# Patient Record
Sex: FEMALE | Race: WHITE | NOT HISPANIC OR LATINO | Employment: FULL TIME | URBAN - METROPOLITAN AREA
[De-identification: names, ages, dates, MRNs, and addresses within clinical notes are randomized per-mention and may not be internally consistent; named-entity substitution may affect disease eponyms.]

---

## 2020-02-21 ENCOUNTER — OFFICE VISIT (OUTPATIENT)
Dept: OBGYN CLINIC | Facility: CLINIC | Age: 65
End: 2020-02-21
Payer: COMMERCIAL

## 2020-02-21 ENCOUNTER — APPOINTMENT (OUTPATIENT)
Dept: RADIOLOGY | Facility: CLINIC | Age: 65
End: 2020-02-21
Payer: COMMERCIAL

## 2020-02-21 VITALS
BODY MASS INDEX: 21.14 KG/M2 | HEIGHT: 61 IN | WEIGHT: 112 LBS | DIASTOLIC BLOOD PRESSURE: 80 MMHG | SYSTOLIC BLOOD PRESSURE: 118 MMHG

## 2020-02-21 DIAGNOSIS — M22.42 CHONDROMALACIA PATELLAE OF LEFT KNEE: Primary | ICD-10-CM

## 2020-02-21 DIAGNOSIS — M25.562 LEFT KNEE PAIN, UNSPECIFIED CHRONICITY: ICD-10-CM

## 2020-02-21 PROCEDURE — 73562 X-RAY EXAM OF KNEE 3: CPT

## 2020-02-21 PROCEDURE — 73564 X-RAY EXAM KNEE 4 OR MORE: CPT

## 2020-02-21 PROCEDURE — 99203 OFFICE O/P NEW LOW 30 MIN: CPT | Performed by: ORTHOPAEDIC SURGERY

## 2020-02-21 RX ORDER — CHOLECALCIFEROL (VITAMIN D3) 125 MCG
TABLET ORAL
COMMUNITY

## 2020-02-21 RX ORDER — CLONAZEPAM 0.5 MG/1
TABLET ORAL
COMMUNITY

## 2020-02-21 RX ORDER — LEVOTHYROXINE SODIUM 137 UG/1
TABLET ORAL
COMMUNITY
Start: 2011-07-05

## 2020-02-21 NOTE — PROGRESS NOTES
Ortho Sports Medicine Knee New Patient Visit     Assesment:   59year old Female Left knee chondromalacia of the patella     Plan:    Conservative treatment:    Ice to knee for 20 minutes at least 1-2 times daily  PT for ROM/strengthening to knee, hip and core  OTC NSAIDS prn for pain  Imaging: All imaging from today was reviewed by myself and explained to the patient  Injection:    No Injection planned at this time  Surgery:     No surgery is recommended at this point, continue with conservative treatment plan as noted  Follow up:    Return in about 6 weeks (around 4/3/2020) for Recheck  If no relief from PT consider MRI         Chief Complaint   Patient presents with    Left Knee - Pain       History of Present Illness: The patient is a 59 y o  female whose occupation is , referred to me by their primary care physician, seen in clinic for consultation of left knee pain  Pain is located anterior, lateral, medial   The patient rates the pain as a varies/10  The pain has been present for 3 months  The patient denies injury  She states that the pain began about 3 months ago and has gradually gotten worse  She stated that the knee increasing pain on Monday this week  She states it did she woke up in the middle of the night with extreme pain behind the kneecap  Then the next morning she was limping   She denies any injury on Monday and denies any previous injury in the past that started this knee pain  She stated that the pain initially began posteriorly stating that she felt like it was a vascular problem  The pain felt like a pulling sensation in the proximal aspect of the calf  In addition to the pain she also feels like she has lost mobility in the left leg stating that she is unable to sit cross-legged with this leg without feeling a pulling sensation in the anterior medial aspect of the knee  She denies any previous treatment    She denies bracing physical therapy or injection  The pain is characterized as sharp, stabbing, dull, achy  The pain is present daily  She denies any clicking or catching within the knee  She denies instability  She denies swelling  She denies pain waking her up from sleep except that Monday night  She denies aggravation with stairs or squatting  Pain is improved by rest   Pain is aggravated by hyperflexion and kneeling  The patient has tried rest, ice and NSAIDS  Knee Surgical History:  None    Past Medical, Social and Family History:  History reviewed  No pertinent past medical history  Past Surgical History:   Procedure Laterality Date     SECTION      VASCULAR SURGERY       No Known Allergies  Current Outpatient Medications on File Prior to Visit   Medication Sig Dispense Refill    clonazePAM (KlonoPIN) 0 5 mg tablet take 1 tablet by oral route 3 times every day      Ergocalciferol (VITAMIN D2) 50 MCG (2000) TABS NO SIG SUPPLIED      levothyroxine (Synthroid) 137 mcg tablet take 1 tablet (25MCG)  by oral route  every day       No current facility-administered medications on file prior to visit        Social History     Socioeconomic History    Marital status: /Civil Union     Spouse name: Not on file    Number of children: Not on file    Years of education: Not on file    Highest education level: Not on file   Occupational History    Not on file   Social Needs    Financial resource strain: Not on file    Food insecurity:     Worry: Not on file     Inability: Not on file    Transportation needs:     Medical: Not on file     Non-medical: Not on file   Tobacco Use    Smoking status: Never Smoker    Smokeless tobacco: Never Used   Substance and Sexual Activity    Alcohol use: Never     Frequency: Never    Drug use: Never    Sexual activity: Not on file   Lifestyle    Physical activity:     Days per week: Not on file     Minutes per session: Not on file    Stress: Not on file   Relationships    Social connections:     Talks on phone: Not on file     Gets together: Not on file     Attends Latter day service: Not on file     Active member of club or organization: Not on file     Attends meetings of clubs or organizations: Not on file     Relationship status: Not on file    Intimate partner violence:     Fear of current or ex partner: Not on file     Emotionally abused: Not on file     Physically abused: Not on file     Forced sexual activity: Not on file   Other Topics Concern    Not on file   Social History Narrative    Not on file         I have reviewed the past medical, surgical, social and family history, medications and allergies as documented in the EMR  Review of systems: ROS is negative other than that noted in the HPI  Constitutional: Negative for fatigue and fever  HENT: Negative for sore throat  Respiratory: Negative for shortness of breath  Cardiovascular: Negative for chest pain  Gastrointestinal: Negative for abdominal pain  Endocrine: Negative for cold intolerance and heat intolerance  Genitourinary: Negative for flank pain  Musculoskeletal: Negative for back pain  Skin: Negative for rash  Allergic/Immunologic: Negative for immunocompromised state  Neurological: Negative for dizziness  Psychiatric/Behavioral: Negative for agitation  Physical Exam:    Blood pressure 118/80, height 5' 1" (1 549 m), weight 50 8 kg (112 lb)      General/Constitutional: NAD, well developed, well nourished  HENT: Normocephalic, atraumatic  CV: Intact distal pulses, regular rate  Resp: No respiratory distress or labored breathing  Lymphatic: No lymphadenopathy palpated  Neuro: Alert and Oriented x 3, no focal deficits  Psych: Normal mood, normal affect, normal judgement, normal behavior  Skin: Warm, dry, no rashes, no erythema      Knee Exam (focused):                RIGHT LEFT   ROM:   0-130 0-130   Palpation: Effusion negative negative     MJL tenderness Negative Negative     LJL tenderness Negative Negative   Meniscus: Ellie Negative Negative    Apley's Compression Negative Negative   Instability: Varus stable stable     Valgus stable stable   Special Tests: Lachman Negative Negative     Posterior drawer Negative Negative     Anterior drawer Negative Negative     Pivot shift not tested not tested     Dial not tested not tested   Patella: Palpation no tenderness no tenderness     Mobility 1/4 1/4     Apprehension Negative Negative   Other: Single leg 1/4 squat Unsteady  unsteady      Abductors: R 5/5 & L 5/5  Popliteal Angles: no defect       LE NV Exam: +2 DP/PT pulses bilaterally  Sensation intact to light touch L2-S1 bilaterally     Bilateral hip ROM demonstrates no pain actively or passively    No calf tenderness to palpation bilaterally    Knee Imaging    X-rays of the left knee were reviewed, which demonstrate no acute fracture or osseous abnormality  X-rays of the Right knee were reviewed, which demonstrate no acute fracture or osseous abnormality  I have reviewed the radiology report and do not currently have a radiology reading from [facility], but will check the result once the reading is performed

## 2025-05-07 ENCOUNTER — PRE-ADMISSION TESTING (OUTPATIENT)
Dept: PREADMISSION TESTING | Facility: HOSPITAL | Age: 70
End: 2025-05-07
Attending: ORTHOPAEDIC SURGERY
Payer: COMMERCIAL

## 2025-05-07 ENCOUNTER — OFFICE VISIT (OUTPATIENT)
Dept: ORTHOPEDICS | Facility: CLINIC | Age: 70
End: 2025-05-07
Payer: COMMERCIAL

## 2025-05-07 ENCOUNTER — TRANSCRIBE ORDERS (OUTPATIENT)
Dept: LAB | Facility: HOSPITAL | Age: 70
End: 2025-05-07

## 2025-05-07 ENCOUNTER — APPOINTMENT (OUTPATIENT)
Dept: LAB | Facility: HOSPITAL | Age: 70
End: 2025-05-07
Attending: ORTHOPAEDIC SURGERY
Payer: COMMERCIAL

## 2025-05-07 VITALS
HEIGHT: 61 IN | SYSTOLIC BLOOD PRESSURE: 104 MMHG | WEIGHT: 116 LBS | BODY MASS INDEX: 21.9 KG/M2 | TEMPERATURE: 98.1 F | OXYGEN SATURATION: 98 % | DIASTOLIC BLOOD PRESSURE: 72 MMHG | HEART RATE: 57 BPM | RESPIRATION RATE: 14 BRPM

## 2025-05-07 DIAGNOSIS — S83.232A COMPLEX TEAR OF MEDIAL MENISCUS OF LEFT KNEE AS CURRENT INJURY: ICD-10-CM

## 2025-05-07 DIAGNOSIS — S83.232A COMPLEX TEAR OF MEDIAL MENISCUS OF LEFT KNEE AS CURRENT INJURY: Primary | ICD-10-CM

## 2025-05-07 DIAGNOSIS — S83.232A COMPLEX TEAR OF MEDIAL MENISCUS OF LEFT KNEE AS CURRENT INJURY, INITIAL ENCOUNTER: Primary | ICD-10-CM

## 2025-05-07 DIAGNOSIS — S83.232A COMPLEX TEAR OF MEDIAL MENISCUS OF LEFT KNEE AS CURRENT INJURY, INITIAL ENCOUNTER: ICD-10-CM

## 2025-05-07 LAB
ANION GAP SERPL CALC-SCNC: 7 MEQ/L (ref 3–15)
ATRIAL RATE: 54
BILIRUB UR QL STRIP.AUTO: NEGATIVE MG/DL
BUN SERPL-MCNC: 15 MG/DL (ref 7–25)
CALCIUM SERPL-MCNC: 10 MG/DL (ref 8.6–10.3)
CHLORIDE SERPL-SCNC: 98 MEQ/L (ref 98–107)
CLARITY UR REFRACT.AUTO: CLEAR
CO2 SERPL-SCNC: 32 MEQ/L (ref 21–31)
COLOR UR AUTO: YELLOW
CREAT SERPL-MCNC: 0.8 MG/DL (ref 0.6–1.2)
EGFRCR SERPLBLD CKD-EPI 2021: >60 ML/MIN/1.73M*2
ERYTHROCYTE [DISTWIDTH] IN BLOOD BY AUTOMATED COUNT: 12.8 % (ref 11.7–14.4)
GLUCOSE SERPL-MCNC: 87 MG/DL (ref 70–99)
GLUCOSE UR STRIP.AUTO-MCNC: NEGATIVE MG/DL
HCT VFR BLD AUTO: 42.8 % (ref 35–45)
HGB BLD-MCNC: 14.5 G/DL (ref 11.8–15.7)
HGB UR QL STRIP.AUTO: NEGATIVE
KETONES UR STRIP.AUTO-MCNC: NEGATIVE MG/DL
LEUKOCYTE ESTERASE UR QL STRIP.AUTO: NEGATIVE
MCH RBC QN AUTO: 31.3 PG (ref 28–33.2)
MCHC RBC AUTO-ENTMCNC: 33.9 G/DL (ref 32.2–35.5)
MCV RBC AUTO: 92.2 FL (ref 83–98)
NITRITE UR QL STRIP.AUTO: NEGATIVE
P AXIS: 67
PH UR STRIP.AUTO: 7.5 [PH]
PLATELET # BLD AUTO: 141 K/UL (ref 150–369)
PMV BLD AUTO: 12.3 FL (ref 9.4–12.3)
POTASSIUM SERPL-SCNC: 4.3 MEQ/L (ref 3.5–5.1)
PR INTERVAL: 152
PROT UR QL STRIP.AUTO: NEGATIVE
QRS DURATION: 72
QT INTERVAL: 452
QTC CALCULATION(BAZETT): 428
R AXIS: 42
RBC # BLD AUTO: 4.64 M/UL (ref 3.93–5.22)
SODIUM SERPL-SCNC: 137 MEQ/L (ref 136–145)
SP GR UR REFRACT.AUTO: 1.01
T WAVE AXIS: 69
UROBILINOGEN UR STRIP-ACNC: 0.2 EU/DL
VENTRICULAR RATE: 54
WBC # BLD AUTO: 3.62 K/UL (ref 3.8–10.5)

## 2025-05-07 PROCEDURE — 85027 COMPLETE CBC AUTOMATED: CPT

## 2025-05-07 PROCEDURE — 36415 COLL VENOUS BLD VENIPUNCTURE: CPT

## 2025-05-07 PROCEDURE — 81003 URINALYSIS AUTO W/O SCOPE: CPT

## 2025-05-07 PROCEDURE — 93005 ELECTROCARDIOGRAM TRACING: CPT

## 2025-05-07 PROCEDURE — 93010 ELECTROCARDIOGRAM REPORT: CPT | Performed by: INTERNAL MEDICINE

## 2025-05-07 PROCEDURE — 80048 BASIC METABOLIC PNL TOTAL CA: CPT

## 2025-05-07 PROCEDURE — 99204 OFFICE O/P NEW MOD 45 MIN: CPT | Performed by: ORTHOPAEDIC SURGERY

## 2025-05-07 RX ORDER — ADHESIVE BANDAGE 7/8"
16-32 BANDAGE TOPICAL AS NEEDED
Status: CANCELLED | OUTPATIENT
Start: 2025-05-07

## 2025-05-07 RX ORDER — DEXTROSE 40 %
15-30 GEL (GRAM) ORAL AS NEEDED
Status: CANCELLED | OUTPATIENT
Start: 2025-05-07

## 2025-05-07 RX ORDER — DEXTROSE 50 % IN WATER (D50W) INTRAVENOUS SYRINGE
25 AS NEEDED
Status: CANCELLED | OUTPATIENT
Start: 2025-05-07

## 2025-05-07 RX ORDER — LEVOTHYROXINE SODIUM 112 UG/1
112 TABLET ORAL EVERY MORNING
COMMUNITY

## 2025-05-07 RX ORDER — LURASIDONE HYDROCHLORIDE 60 MG/1
60 TABLET, FILM COATED ORAL NIGHTLY
COMMUNITY
Start: 2025-03-03

## 2025-05-07 RX ORDER — CLONAZEPAM 0.5 MG/1
0.5 TABLET ORAL NIGHTLY
COMMUNITY

## 2025-05-07 RX ORDER — PSEUDOEPHEDRINE HCL 30 MG
TABLET ORAL
COMMUNITY

## 2025-05-07 RX ORDER — SODIUM CHLORIDE 9 MG/ML
INJECTION, SOLUTION INTRAVENOUS CONTINUOUS
Status: CANCELLED | OUTPATIENT
Start: 2025-05-07 | End: 2025-05-08

## 2025-05-07 NOTE — H&P (VIEW-ONLY)
History and Physical  Pre-admission testing         HISTORY OF PRESENT ILLNESS      Amy Jimenez is an 69 y.o. female with a past medical history of depression,  stage III anal cancer s/p excision/ chemo/radiation therapy, hypothyroidism, anemia, squamous cell cancer nose and arthritis. She presents to [facility] with diagnosis of Complex tear of medial meniscus of left knee for preoperative evaluation prior to planned surgery. She denies trauma . She is scheduled for Left KNEE ARTHROSCOPY, MENISCECTOMY, SYNOVECTOMY on 2025  with Iván Ferris  PAST MEDICAL AND SURGICAL HISTORY      Past Medical History:   Diagnosis Date    Agranulocytosis secondary to cancer chemotherapy (CMS/HCC)     Anal cancer (CMS/HCC) 2020    Squamous cell carcinoma of anal canal , Squamous cell carcinoma of anal canal. STAGE: T2N1, IIIA, with regional perirectal nodes.    Anemia     Iron deficiency anemia due to chronic blood loss    Arthritis     Primary osteoarthritis of left knee    Depression     Squamous cell cancer of skin of nose        Past Surgical History   Procedure Laterality Date    Anus surgery      anal cancer -s/p excision of mass/chemo & radiation    Breast excisional biopsy Right     BREAST EXCISIONAL BIOPSY Right     section      x3    Colonoscopy      Cosmetic surgery      Partial hysterectomy      Sigmoidoscopy      Sigmoidoscopy and resection of anal canal lesion    Tonsillectomy      Vascular surgery Right     VARICOSE VEIN SURGERY  saphenous vein removed       PROBLEM LIST     Patient Active Problem List   Diagnosis    Complex tear of medial meniscus of left knee as current injury       MEDICATIONS        Current Outpatient Medications:     BENEFIBER SUGAR FREE, INULIN, ORAL, Take 1 tablet by mouth daily., Disp: , Rfl:     calcium citrate 250 mg calcium tablet, Take by mouth., Disp: , Rfl:     cholecalciferol, vitamin D3, (VITAMIN D3 ORAL), Take 1,000 Units by mouth daily., Disp: , Rfl:      "clonazePAM (klonoPIN) 0.5 mg tablet, Take 0.5 mg by mouth nightly., Disp: , Rfl:     levothyroxine (SYNTHROID) 112 mcg tablet, Take 112 mcg by mouth every morning., Disp: , Rfl:     lurasidone (LATUDA) 60 mg tablet, Take 60 mg by mouth nightly., Disp: , Rfl:     ALLERGIES      No Known Allergies    FAMILY HISTORY      Family History   Problem Relation Name Age of Onset    Pancreatic cancer Biological Mother      Leukemia Biological Father         SOCIAL HISTORY      Social History     Socioeconomic History    Marital status:      Spouse name: Not on file    Number of children: 3    Years of education: Not on file    Highest education level: Not on file   Occupational History    Occupation: Retired   Tobacco Use    Smoking status: Never    Smokeless tobacco: Never   Substance and Sexual Activity    Alcohol use: Yes     Comment: one drink per week    Drug use: Defer    Sexual activity: Not on file   Other Topics Concern    Not on file   Social History Narrative    Lives with spouse in a ranch style home     Social Drivers of Health     Financial Resource Strain: Not on file   Food Insecurity: Not on file   Transportation Needs: Not on file   Physical Activity: Not on file   Stress: Not on file   Social Connections: Not on file   Intimate Partner Violence: Not on file   Housing Stability: Not on file       REVIEW OF SYSTEMS      Review of Systems   Constitutional:  Negative for chills and fever.   Musculoskeletal:  Positive for arthralgias.        Left knee pain   All other systems reviewed and are negative.      PHYSICAL EXAMINATION      Visit Vitals  /72 (BP Location: Right upper arm, Patient Position: Sitting)   Pulse (!) 57   Temp 36.7 °C (98.1 °F) (Temporal)   Resp 14   Ht 1.549 m (5' 1\")   Wt 52.6 kg (116 lb)   SpO2 98%   BMI 21.92 kg/m²     Body mass index is 21.92 kg/m².    Physical Exam  Vitals reviewed.   Constitutional:       Appearance: Normal appearance.   HENT:      Head: Normocephalic and " atraumatic.      Ears:      Comments: Deferred     Nose: Nose normal. No congestion or rhinorrhea.      Mouth/Throat:      Comments: Deferred  Eyes:      Extraocular Movements: Extraocular movements intact.      Conjunctiva/sclera: Conjunctivae normal.      Pupils: Pupils are equal, round, and reactive to light.   Cardiovascular:      Rate and Rhythm: Normal rate and regular rhythm.      Pulses: Normal pulses.           Dorsalis pedis pulses are 2+ on the right side and 2+ on the left side.        Posterior tibial pulses are 2+ on the right side and 2+ on the left side.      Heart sounds: Normal heart sounds, S1 normal and S2 normal. No murmur heard.  Pulmonary:      Effort: Pulmonary effort is normal. No respiratory distress.      Breath sounds: Normal breath sounds. No wheezing.   Abdominal:      General: Bowel sounds are normal. There is no distension.      Palpations: Abdomen is soft.      Tenderness: There is no abdominal tenderness. There is no guarding.   Genitourinary:     Comments: Deferred  Musculoskeletal:         General: Normal range of motion.      Cervical back: Normal range of motion and neck supple.      Right lower leg: No edema.      Left lower leg: No edema.      Comments: Limited ROM at the left knee   Skin:     General: Skin is warm and dry.   Neurological:      General: No focal deficit present.      Mental Status: She is alert and oriented to person, place, and time.   Psychiatric:         Mood and Affect: Mood normal.         Behavior: Behavior normal.            DARRYL Swann  5/7/2025

## 2025-05-23 NOTE — DISCHARGE INSTRUCTIONS
[facility] Post Operative Surgical Instructions    Surgeon: Iván Bonilla MD    You had the following procedure performed at [facility] on     Procedure: Surgical Arthroscopy Left Knee    Weight Bearing: As tolerated Left lower extremity   -If you had surgery on your leg, use crutches as needed.    Dressing/Wound Care:  -Keep dressings clean and dry.  -You may remove your dressings in 3 days, then cover with a clean bandage  -Once dressing is removed, DO NOT soak incision, NO baths. After you shower, pat incision dry lightly.  -Some drainage from incisions is to be expected.  However, if significant drainage occurs, call the office or go to emergency room immediately.  -If you experience fevers and chills with increase in pain, redness and/or drainage from incisions, call the office or go to emergency room immediately.  -If provided, please wear your brace/splint until seen by your surgeon.    Office Follow Up:  -If you do not already have a follow up appointment, please call the following office as soon as you get home to schedule an appointment.  -Your surgeon would like to see you in the office in two weeks    Please call: [phone] to make follow-up appointment     [facility] [address], [address]  [place] [address]   [phone]    Medications:  Please take your pain medication with food.  You may use Tylenol or Motrin for pain instead of the narcotics if your pain is not significant.  Narcotic pain medication can be constipating.  If you experience constipation, try an over the counter stool softener.      Information:  DO NOT drive until seen by your surgeon.  You may resume your previous diet.  Always keep your dressings clean and dry.  DO NOT return to work until seen by your surgeon.  If you have any questions or concerns, please do not hesitate to call the office.

## 2025-05-27 NOTE — PROGRESS NOTES
[facility]     Patient ID: Amy Jimenez is a 69 y.o. female.  1955  Chief Complaint: First postop visit status post left knee arthroscopy from 5/29/2025     HPI: Status post left knee arthroscopy from 5/29/2025.  No complications reported.  No shortness of breath no calf pain no fever chills or sweats     Review of Systems:  Review of systems negative except as stated in above HPI.           Vitals   There were no vitals filed for this visit.     There is no height or weight on file to calculate BMI.     Physical Exam: Left knee with full extension.  Almost full flexion.  Calf is soft.  No effusion whatsoever.  She looks terrific       Imaging: MRI study of the left knee dated 8/2/2024 consistent with a medial meniscal tear.  There is some arthritis of both the medial compartment and patellofemoral joint but is not end-stage and she does have greater than 50% joint space at least in that evaluation.     Assessment/Plan: Doing well postoperatively.  Recommendation for supervised physical therapy and she can do activities as tolerated and follow-up as needed     Iván Bonilla MD

## 2025-05-28 ENCOUNTER — ANESTHESIA EVENT (OUTPATIENT)
Dept: SURGERY | Facility: HOSPITAL | Age: 70
Setting detail: HOSPITAL OUTPATIENT SURGERY
End: 2025-05-28
Payer: COMMERCIAL

## 2025-05-28 RX ORDER — OXYCODONE AND ACETAMINOPHEN 5; 325 MG/1; MG/1
1-2 TABLET ORAL EVERY 6 HOURS PRN
Qty: 30 TABLET | Refills: 0 | Status: SHIPPED | OUTPATIENT
Start: 2025-05-28 | End: 2025-06-02

## 2025-05-28 RX ORDER — TEMAZEPAM 7.5 MG/1
7.5 CAPSULE ORAL NIGHTLY PRN
Qty: 6 CAPSULE | Refills: 0 | Status: SHIPPED | OUTPATIENT
Start: 2025-05-28 | End: 2025-06-03

## 2025-05-29 ENCOUNTER — HOSPITAL ENCOUNTER (OUTPATIENT)
Facility: HOSPITAL | Age: 70
Setting detail: HOSPITAL OUTPATIENT SURGERY
Discharge: HOME | End: 2025-05-29
Attending: ORTHOPAEDIC SURGERY | Admitting: ORTHOPAEDIC SURGERY
Payer: COMMERCIAL

## 2025-05-29 ENCOUNTER — ANESTHESIA (OUTPATIENT)
Dept: SURGERY | Facility: HOSPITAL | Age: 70
Setting detail: HOSPITAL OUTPATIENT SURGERY
End: 2025-05-29
Payer: COMMERCIAL

## 2025-05-29 VITALS
HEART RATE: 66 BPM | SYSTOLIC BLOOD PRESSURE: 128 MMHG | RESPIRATION RATE: 16 BRPM | HEIGHT: 61 IN | BODY MASS INDEX: 21.34 KG/M2 | TEMPERATURE: 97.2 F | DIASTOLIC BLOOD PRESSURE: 72 MMHG | OXYGEN SATURATION: 100 % | WEIGHT: 113 LBS

## 2025-05-29 PROCEDURE — 63600000 HC DRUGS/DETAIL CODE: Mod: JZ

## 2025-05-29 PROCEDURE — 37000001 HC ANESTHESIA GENERAL: Performed by: ORTHOPAEDIC SURGERY

## 2025-05-29 PROCEDURE — 36000014 HC OR LEVEL 4 EA ADDL MIN: Performed by: ORTHOPAEDIC SURGERY

## 2025-05-29 PROCEDURE — 29881 ARTHRS KNE SRG MNISECTMY M/L: CPT | Mod: LT | Performed by: ORTHOPAEDIC SURGERY

## 2025-05-29 PROCEDURE — 25000000 HC PHARMACY GENERAL: Performed by: ORTHOPAEDIC SURGERY

## 2025-05-29 PROCEDURE — 0SBD4ZZ EXCISION OF LEFT KNEE JOINT, PERCUTANEOUS ENDOSCOPIC APPROACH: ICD-10-PCS | Performed by: ORTHOPAEDIC SURGERY

## 2025-05-29 PROCEDURE — 71000012 HC PACU PHASE 2 EA ADDL MIN: Performed by: ORTHOPAEDIC SURGERY

## 2025-05-29 PROCEDURE — 36000004 HC OR LEVEL 4 INITIAL 30MIN: Performed by: ORTHOPAEDIC SURGERY

## 2025-05-29 PROCEDURE — 97161 PT EVAL LOW COMPLEX 20 MIN: CPT | Mod: GP

## 2025-05-29 PROCEDURE — 25000000 HC PHARMACY GENERAL

## 2025-05-29 PROCEDURE — 71000002 HC PACU PHASE 2 INITIAL 30MIN: Performed by: ORTHOPAEDIC SURGERY

## 2025-05-29 PROCEDURE — 25800000 HC PHARMACY IV SOLUTIONS: Performed by: ORTHOPAEDIC SURGERY

## 2025-05-29 PROCEDURE — 71000011 HC PACU PHASE 1 EA ADDL MIN: Performed by: ORTHOPAEDIC SURGERY

## 2025-05-29 PROCEDURE — 71000001 HC PACU PHASE 1 INITIAL 30MIN: Performed by: ORTHOPAEDIC SURGERY

## 2025-05-29 RX ORDER — FENTANYL CITRATE 50 UG/ML
INJECTION, SOLUTION INTRAMUSCULAR; INTRAVENOUS AS NEEDED
Status: DISCONTINUED | OUTPATIENT
Start: 2025-05-29 | End: 2025-05-29 | Stop reason: SURG

## 2025-05-29 RX ORDER — MIDAZOLAM HYDROCHLORIDE 2 MG/2ML
INJECTION, SOLUTION INTRAMUSCULAR; INTRAVENOUS AS NEEDED
Status: DISCONTINUED | OUTPATIENT
Start: 2025-05-29 | End: 2025-05-29 | Stop reason: SURG

## 2025-05-29 RX ORDER — FENTANYL CITRATE 50 UG/ML
50 INJECTION, SOLUTION INTRAMUSCULAR; INTRAVENOUS EVERY 5 MIN PRN
Status: DISCONTINUED | OUTPATIENT
Start: 2025-05-29 | End: 2025-05-29 | Stop reason: HOSPADM

## 2025-05-29 RX ORDER — EPHEDRINE SULFATE/0.9% NACL/PF 50 MG/5 ML
SYRINGE (ML) INTRAVENOUS AS NEEDED
Status: DISCONTINUED | OUTPATIENT
Start: 2025-05-29 | End: 2025-05-29 | Stop reason: SURG

## 2025-05-29 RX ORDER — DEXAMETHASONE SODIUM PHOSPHATE 4 MG/ML
INJECTION, SOLUTION INTRA-ARTICULAR; INTRALESIONAL; INTRAMUSCULAR; INTRAVENOUS; SOFT TISSUE AS NEEDED
Status: DISCONTINUED | OUTPATIENT
Start: 2025-05-29 | End: 2025-05-29 | Stop reason: SURG

## 2025-05-29 RX ORDER — ONDANSETRON HYDROCHLORIDE 2 MG/ML
4 INJECTION, SOLUTION INTRAVENOUS
Status: DISCONTINUED | OUTPATIENT
Start: 2025-05-29 | End: 2025-05-29 | Stop reason: HOSPADM

## 2025-05-29 RX ORDER — DEXTROSE 40 %
15-30 GEL (GRAM) ORAL AS NEEDED
Status: DISCONTINUED | OUTPATIENT
Start: 2025-05-29 | End: 2025-05-29 | Stop reason: HOSPADM

## 2025-05-29 RX ORDER — PROPOFOL 10 MG/ML
INJECTION, EMULSION INTRAVENOUS AS NEEDED
Status: DISCONTINUED | OUTPATIENT
Start: 2025-05-29 | End: 2025-05-29 | Stop reason: SURG

## 2025-05-29 RX ORDER — DEXTROSE 50 % IN WATER (D50W) INTRAVENOUS SYRINGE
25 AS NEEDED
Status: DISCONTINUED | OUTPATIENT
Start: 2025-05-29 | End: 2025-05-29 | Stop reason: HOSPADM

## 2025-05-29 RX ORDER — SODIUM CHLORIDE 9 MG/ML
INJECTION, SOLUTION INTRAVENOUS CONTINUOUS
Status: DISCONTINUED | OUTPATIENT
Start: 2025-05-29 | End: 2025-05-29 | Stop reason: HOSPADM

## 2025-05-29 RX ORDER — ONDANSETRON HYDROCHLORIDE 2 MG/ML
INJECTION, SOLUTION INTRAVENOUS AS NEEDED
Status: DISCONTINUED | OUTPATIENT
Start: 2025-05-29 | End: 2025-05-29 | Stop reason: SURG

## 2025-05-29 RX ORDER — BUPIVACAINE HYDROCHLORIDE AND EPINEPHRINE 5; 5 MG/ML; UG/ML
INJECTION, SOLUTION EPIDURAL; INTRACAUDAL; PERINEURAL
Status: DISCONTINUED | OUTPATIENT
Start: 2025-05-29 | End: 2025-05-29 | Stop reason: HOSPADM

## 2025-05-29 RX ORDER — ACETAMINOPHEN 325 MG/1
650 TABLET ORAL EVERY 4 HOURS PRN
Status: DISCONTINUED | OUTPATIENT
Start: 2025-05-29 | End: 2025-05-29 | Stop reason: HOSPADM

## 2025-05-29 RX ORDER — OXYCODONE HYDROCHLORIDE 5 MG/1
5 TABLET ORAL EVERY 4 HOURS PRN
Status: DISCONTINUED | OUTPATIENT
Start: 2025-05-29 | End: 2025-05-29 | Stop reason: HOSPADM

## 2025-05-29 RX ORDER — LIDOCAINE HYDROCHLORIDE 10 MG/ML
INJECTION, SOLUTION EPIDURAL; INFILTRATION; INTRACAUDAL; PERINEURAL AS NEEDED
Status: DISCONTINUED | OUTPATIENT
Start: 2025-05-29 | End: 2025-05-29 | Stop reason: SURG

## 2025-05-29 RX ORDER — ADHESIVE BANDAGE 7/8"
16-32 BANDAGE TOPICAL AS NEEDED
Status: DISCONTINUED | OUTPATIENT
Start: 2025-05-29 | End: 2025-05-29 | Stop reason: HOSPADM

## 2025-05-29 RX ADMIN — LIDOCAINE HYDROCHLORIDE 5 ML: 10 INJECTION, SOLUTION EPIDURAL; INFILTRATION; INTRACAUDAL; PERINEURAL at 12:55

## 2025-05-29 RX ADMIN — CEFAZOLIN 2 G: 2 INJECTION, POWDER, FOR SOLUTION INTRAMUSCULAR; INTRAVENOUS at 13:00

## 2025-05-29 RX ADMIN — DEXAMETHASONE SODIUM PHOSPHATE 4 MG: 4 INJECTION, SOLUTION INTRA-ARTICULAR; INTRALESIONAL; INTRAMUSCULAR; INTRAVENOUS; SOFT TISSUE at 13:00

## 2025-05-29 RX ADMIN — FENTANYL CITRATE 100 MCG: 50 INJECTION, SOLUTION INTRAMUSCULAR; INTRAVENOUS at 12:55

## 2025-05-29 RX ADMIN — ONDANSETRON HYDROCHLORIDE 4 MG: 2 SOLUTION INTRAMUSCULAR; INTRAVENOUS at 13:10

## 2025-05-29 RX ADMIN — Medication 10 MG: at 13:25

## 2025-05-29 RX ADMIN — PROPOFOL 200 MG: 10 INJECTION, EMULSION INTRAVENOUS at 12:55

## 2025-05-29 RX ADMIN — Medication 10 MG: at 13:00

## 2025-05-29 RX ADMIN — MIDAZOLAM HYDROCHLORIDE 2 MG: 1 INJECTION, SOLUTION INTRAMUSCULAR; INTRAVENOUS at 12:50

## 2025-05-29 RX ADMIN — SODIUM CHLORIDE: 9 INJECTION, SOLUTION INTRAVENOUS at 12:50

## 2025-05-29 ASSESSMENT — COGNITIVE AND FUNCTIONAL STATUS - GENERAL
MOVING TO AND FROM BED TO CHAIR: 3 - A LITTLE
STANDING UP FROM CHAIR USING ARMS: 3 - A LITTLE
CLIMB 3 TO 5 STEPS WITH RAILING: 3 - A LITTLE
WALKING IN HOSPITAL ROOM: 3 - A LITTLE
AFFECT: WFL

## 2025-05-29 NOTE — PROGRESS NOTES
Physical Therapy -  Initial Evaluation     Patient: Amy Jimenez  [facility]  MRN: [id number]  Today's date: 5/29/2025    HISTORY OF PRESENT ILLNESS     Amy is a 69 y.o. female admitted on 5/29/2025 with Complex tear of medial meniscus of left knee as current injury, initial encounter [S83.232A]. Principal problem is Complex tear of medial meniscus of left knee as current injury.    Past Medical History  Amy has a past medical history of Agranulocytosis secondary to cancer chemotherapy (CMS/[facility]), Anal cancer (CMS/[facility]) (05/2020), Anemia, Arthritis, Depression, Hypothyroidism, and Squamous cell cancer of skin of nose.    History of Present Illness   Patient s/p Left KNEE ARTHROSCOPY, MENISCECTOMY, SYNOVECTOMY on 5/29/2025  with Dr Bonilla. WBAT left LE    PRIOR LEVEL OF FUNCTION AND LIVING ENVIRONMENT     Prior Level of Function      Flowsheet Row Most Recent Value   Dominant Hand right   Ambulation independent   Transferring independent   Toileting independent   Bathing independent   Dressing independent   Eating independent   IADLs independent   Driving/Transportation    Communication understands/communicates without difficulty   Prior Level of Function Comment Fully independent without AD. Active. Drives. Ind with ADL's   Assistive Device Currently Used at Home none        History of Falls: No falls in the past year    Prior Living Environment      Flowsheet Row Most Recent Value   People in Home spouse   Current Living Arrangements home   Living Environment Comment Lives with  in 1 , 1 RASHAUN. Tub or WIS available. No GB or SC.            VITALS AND PAIN     PT Vitals      Date/Time Pulse Resp SpO2 O2 Therapy BP [facility]   05/29/25 1445 62 16 100 % None (Room air) 122/75 [facility]          PT Pain      Date/Time Pain Type Location Rating: Rest Description Interventions [facility] [employer] [facility]   05/29/25 1445 Pain Assessment knee 2 sore pain management plan  reviewed with patient/caregiver [facility]               Objective     OBJECTIVE     Start time:  1444  End time:  1457  Session Length: 13 min       General Observations  Patient received upright, in chair, leg(s) elevated. She was agreeable to therapy, no issues or concerns identified by nurse prior to session. Patient in chair.  present and supportive. Left knee with ACE wrap.    Precautions: fall, weight bearing  Extremity Weight-Bearing Status: left lower extremity  Left LE Weight-Bearing Status: weight-bearing as tolerated (WBAT)     Services  Do You Speak a Language Other Than English at Home?: no  Preferred Language: English  Is an  Needed/Used?: No      PT Eval and Treat - 05/29/25 1444          Cognition    Orientation Status oriented x 4     Affect/Mental Status WFL     Follows Commands WFL     Cognitive Function WFL     Comment, Cognition Receptive to education. Follows commands consistently.        Vision Assessment/Intervention    Vision Assessment corrective lenses full-time        Hearing Assessment    Hearing Status WFL        Sensory Assessment    Sensory Assessment sensation intact, lower extremities        Lower Extremity Assessment    LE Assessment ROM and Strength WFL except        Lower Extremity Range of Motion    Knee, Left (ROM) 0-90        Lower Extremity Strength    Knee, Left (Strength) 3        Bed Mobility    Comment Not tested        Mobility Belt    Mobility Belt Used During Session yes        Sit/Stand Transfer    Surface chair with arm rests     Gaston modified independence     Assistive Device crutches, axillary     Transfer Comments Transfers with ease. Crutches to one side.        Gait Training    Gaston, Gait supervision     Safety/Cues technique;minimal;verbal cues;sequencing;proper use of assistive device     Assistive Device crutches, axillary     Distance in Feet 60 feet     Pattern step-to     Comment Step to gait pattern. Minimal  use/reliance on crutches.        Stairs Training    Comment Discussed how to navigate the one step to enter her home.        Balance    Static Sitting Balance WFL     Dynamic Sitting Balance WFL     Sit to Stand Dynamic Balance WFL     Static Standing Balance WFL     Dynamic Standing Balance WFL        Lower Extremity (Therapeutic Exercise)    Comment Issued HEP and performed each exercise        Impairments/Safety Issues    Impairments Affecting Function endurance/activity tolerance     Functional Endurance Minimal fatigue noted. Minimal pain.                   Education Documentation  Safe ambulation with crutches.       Session Outcome  Patient upright, in chair, leg(s) elevated at end of session, all needs met, call light in reach, personal items in reach. Nursing notified about patient's performance and patient's position.    AM-PAC™ - Mobility (Current Function)     Turning form your back to your side while in flat bed without using bedrails 3 - A Little   Moving from lying on your back to sitting on the side of a flat bed without using bedrails 3 - A Little   Moving to and from a bed to a chair 3 - A Little   Standing up from a chair using your arms 3 - A Little   To walk in a hospital room 3 - A Little   Climbing 3-5 steps with a railing 3 - A Little   AM-PAC™ Mobility Score 18          ASSESSMENT AND PLAN     Progress Summary  PT evaluation completed. Patient issued crutches and HEP. Reviewed the exercises and performed a few reps of each. Discussed stair training. Answered all questions of patient and . Cleared for discharge to home when stable.    Patient/Family Therapy Goals Statement: To go home.    PT Plan      Flowsheet Row Most Recent Value   Therapy Frequency evaluation only at 05/29/2025 1444            PT Discharge Recommendations      Flowsheet Row Most Recent Value   PT Recommended Discharge Disposition home at 05/29/2025 1444   Anticipated Equipment Needs if Discharged Home (PT)  crutches, axillary at 05/29/2025 [id number]

## 2025-05-29 NOTE — HOSPITAL COURSE
Amy is a 69 y.o. female admitted on 5/29/2025 with Complex tear of medial meniscus of left knee as current injury, initial encounter [S83.347A]. Principal problem is Complex tear of medial meniscus of left knee as current injury.    Past Medical History  Amy has a past medical history of Agranulocytosis secondary to cancer chemotherapy (CMS/HCC), Anal cancer (CMS/HCC) (05/2020), Anemia, Arthritis, Depression, Hypothyroidism, and Squamous cell cancer of skin of nose.    History of Present Illness   Patient s/p Left KNEE ARTHROSCOPY, MENISCECTOMY, SYNOVECTOMY on 5/29/2025  with Dr Bonilla. WBAT left LE

## 2025-05-29 NOTE — OP NOTE
REPORT TYPE: Operative Note    DATE OF OPERATION: 05/29/2025    PROCEDURES PERFORMED: Examination under anesthesia of left knee, diagnostic and operative left knee arthroscopy with partial medial meniscectomy.    PREOPERATIVE DIAGNOSES: Medial meniscal tear, degenerative joint disease.    POSTOPERATIVE DIAGNOSES: Medial meniscal tear, degenerative joint disease.    SURGEON: Iván Bonilla MD    ANESTHESIA: General.    DRAINS, PACKS, TUBES: None.    ESTIMATED BLOOD LOSS: Minimal    COMPLICATIONS: None.    SPECIMENS: None.    MEDICATIONS:    DESCRIPTION OF PROCEDURE: The patient was taken to the operating room, induced under general anesthesia, received intravenous perioperative antibiotics. Knee was examined. Full passive motion. No effusion. Following the examination, the leg prepped and   draped in the usual sterile fashion. Diagnostic arthroscopy performed through the lateral and subsequent medial parapatellar portals after the limb was exsanguinated and tourniquet inflated to 300 mmHg. Visualization of the patellofemoral joint   demonstrated exuberant synovitis that was removed for exposure, but the patellofemoral joint only had some grade 1-2 change in the patella and really only grade 1 change in the trochlea. Neither required a chondroplasty. The medial compartment showed a   very large posterior horn medial meniscal tear with flap components extending both anterior and posterior and there was a flipped piece as well in the medial gutter. Partial meniscectomy performed to a stable rim. Posteriorly, she did have some grade 2   change and some mild grade 3 change adjacent to the meniscal tear, but in the extension weightbearing surface, the knee was clean. The tibia was relatively clean with the exception of some posterior grade 1 change. The intercondylar notch was normal. The   lateral compartment had diffuse grade 1-2 change of the tibial plateau and a clean femoral condyle laterally, but no meniscal  tearing. Lateral gutter had a spur. Popliteus intact. Final photos taken. All instrumentation removed followed by the   application of Steri-Strips, well-padded dressing and the patient returned to the recovery room in stable condition without incident.      Iván Bonilla MD    DD: 05/29/2025 13:34  DT: 05/29/2025 14:33  Voice ID: [id number]/Report ID: [id number]

## 2025-05-29 NOTE — OR SURGEON
Pre-Procedure patient identification:  I am the primary operating surgeon/proceduralist and I have reviewed the applicable pathology reports and radiology studies for this procedure. I have identified the patient and confirmed laterality is left on 05/29/25 at 11:19 AM Iván Bonilla MD  Phone Number: [phone]

## 2025-06-06 ENCOUNTER — OFFICE VISIT (OUTPATIENT)
Dept: ORTHOPEDICS | Facility: CLINIC | Age: 70
End: 2025-06-06
Payer: COMMERCIAL

## 2025-06-06 DIAGNOSIS — S83.232A COMPLEX TEAR OF MEDIAL MENISCUS OF LEFT KNEE AS CURRENT INJURY, INITIAL ENCOUNTER: ICD-10-CM

## 2025-06-06 DIAGNOSIS — S83.232D COMPLEX TEAR OF MEDIAL MENISCUS OF LEFT KNEE AS CURRENT INJURY, SUBSEQUENT ENCOUNTER: Primary | ICD-10-CM

## 2025-06-06 PROCEDURE — 99024 POSTOP FOLLOW-UP VISIT: CPT | Performed by: ORTHOPAEDIC SURGERY

## (undated) DEVICE — SYRINGE DISP LUER-LOK 30 CC

## (undated) DEVICE — MATS FLOOR ABSORBE YELLOW 36IN X 40IN

## (undated) DEVICE — STIRRUP STRAP DISPOSABLE

## (undated) DEVICE — TUBING PUMP ARTHROSCOPY REDEUCE

## (undated) DEVICE — GLOVE SURG PROTEXIS PF 8

## (undated) DEVICE — APPLICATOR CHLORAPREP 26ML ORANGE TINT

## (undated) DEVICE — GAUZE 8X4 16 PLY RFID DOUBLE XRAY

## (undated) DEVICE — CUFF TOURNIQUET DISP 34 X 4

## (undated) DEVICE — PACK SUPPLEMENTAL KNEE ARTH

## (undated) DEVICE — PACK MLHS KNEE ARTH STDZ

## (undated) DEVICE — SPONGE GAUZE W4XL4IN RAYON POLYESTER 4 PLY NONWOVEN FASTER W

## (undated) DEVICE — MANIFOLD FOUR PORT NEPTUNE

## (undated) DEVICE — BLADE SHAVER TOMCAT 3.5MM

## (undated) DEVICE — GLOVE SZ 8 LINER PROTEXIS PI BL

## (undated) DEVICE — BANDAGE ACE XL 6 X 15

## (undated) DEVICE — SALINE .9% 3 LITER BAG

## (undated) DEVICE — TUBING PATIENT ARTHROSCOPY REDEUCE